# Patient Record
Sex: MALE | Race: BLACK OR AFRICAN AMERICAN | Employment: FULL TIME | URBAN - METROPOLITAN AREA
[De-identification: names, ages, dates, MRNs, and addresses within clinical notes are randomized per-mention and may not be internally consistent; named-entity substitution may affect disease eponyms.]

---

## 2019-09-29 ENCOUNTER — HOSPITAL ENCOUNTER (EMERGENCY)
Age: 34
Discharge: HOME | End: 2019-09-29
Attending: EMERGENCY MEDICINE
Payer: COMMERCIAL

## 2019-09-29 ENCOUNTER — APPOINTMENT (OUTPATIENT)
Dept: GENERAL RADIOLOGY | Age: 34
End: 2019-09-29
Attending: EMERGENCY MEDICINE
Payer: COMMERCIAL

## 2019-09-29 VITALS
HEIGHT: 72 IN | DIASTOLIC BLOOD PRESSURE: 117 MMHG | BODY MASS INDEX: 34.54 KG/M2 | SYSTOLIC BLOOD PRESSURE: 131 MMHG | OXYGEN SATURATION: 100 % | RESPIRATION RATE: 18 BRPM | TEMPERATURE: 97.8 F | HEART RATE: 105 BPM | WEIGHT: 255 LBS

## 2019-09-29 DIAGNOSIS — M76.60 ACHILLES TENDON PAIN: Primary | ICD-10-CM

## 2019-09-29 PROCEDURE — 75810000053 HC SPLINT APPLICATION

## 2019-09-29 PROCEDURE — 73610 X-RAY EXAM OF ANKLE: CPT

## 2019-09-29 PROCEDURE — 99282 EMERGENCY DEPT VISIT SF MDM: CPT

## 2019-09-29 RX ORDER — IBUPROFEN 600 MG/1
600 TABLET ORAL
Qty: 20 TAB | Refills: 0 | Status: SHIPPED | OUTPATIENT
Start: 2019-09-29

## 2019-09-29 NOTE — ED PROVIDER NOTES
EMERGENCY DEPARTMENT HISTORY AND PHYSICAL EXAM      Date: 9/29/2019  Patient Name: Claudia Ruiz    History of Presenting Illness     Chief Complaint   Patient presents with    Ankle Pain     reports pta while playing flag football, reports feeling a pop in his right ankle. took 2 aleve pta       History Provided By: Patient    HPI: Claudia Ruiz, 29 y.o. male presents to the ED with cc of right posterior ankle pain since prior to arrival.  Patient states he was playing flag football prior to arrival, his she was untied he stepped back and felt a pop in the back of his ankle. He notes he started with immediate ankle pain in the area. He attributes this to a possible Achilles tendon rupture. He notes someone on the field gave him 2 Aleve prior to arrival.  Pain is worse with movement, better with rest.  He denies any pain currently. Patient denies any knee pain, foot pain, tingling, numbness, chest pain, shortness of breath, and abdominal pain. Patient denies any history of injury or surgery to the right ankle. There are no other complaints, changes, or physical findings at this time. PCP: Lynne Costello MD    No current facility-administered medications on file prior to encounter. No current outpatient medications on file prior to encounter. Past History     Past Medical History:  No past medical history on file. Past Surgical History:  No past surgical history on file. Family History:  No family history on file. Social History:  Social History     Tobacco Use    Smoking status: Not on file   Substance Use Topics    Alcohol use: Not on file    Drug use: Not on file       Allergies:  No Known Allergies      Review of Systems   Review of Systems   Constitutional: Negative. Negative for chills and fever. HENT: Negative. Negative for rhinorrhea and sore throat. Eyes: Negative. Negative for visual disturbance. Respiratory: Negative.   Negative for cough, chest tightness, shortness of breath and wheezing. Cardiovascular: Negative. Negative for chest pain and palpitations. Gastrointestinal: Negative. Negative for abdominal pain, constipation, diarrhea, nausea and vomiting. Genitourinary: Negative. Negative for dysuria and hematuria. Musculoskeletal: Positive for arthralgias and joint swelling. Negative for myalgias. Skin: Negative. Negative for rash. Allergic/Immunologic: Negative. Negative for environmental allergies and food allergies. Neurological: Negative. Negative for headaches. Psychiatric/Behavioral: Negative. Negative for suicidal ideas. Physical Exam   Physical Exam   Constitutional: He is oriented to person, place, and time. He appears well-developed and well-nourished. No distress. Pt appears well, awake and alert in NAD. HENT:   Head: Normocephalic and atraumatic. Right Ear: External ear normal.   Left Ear: External ear normal.   Nose: Nose normal.   Eyes: Conjunctivae are normal. Right eye exhibits no discharge. Left eye exhibits no discharge. Cardiovascular: Normal rate, normal heart sounds and intact distal pulses. 2+ D.P. Pulses b/l. *   Pulmonary/Chest: Effort normal and breath sounds normal. No stridor. No respiratory distress. He has no wheezes. He has no rales. Abdominal: Soft. Bowel sounds are normal. There is no tenderness. There is no guarding. No CVA tenderness b/l. Musculoskeletal: He exhibits tenderness. He exhibits no edema. R ankle: No edema, erythema, or obvious bony deformity. No bony TTP. + TTP over achilles insertion site of posterior ankle. No achilles definition to posterior R ankle. Achilles definition to posterior L ankle. Difficult to assess Preston Post test due to patient size. Decreased flexion ROM secondary to discomfort. .   Neurological: He is alert and oriented to person, place, and time. Coordination normal.   No focal neuro deficits. Skin: Skin is warm and dry. No rash noted.  He is not diaphoretic. No erythema. No pallor. Psychiatric: He has a normal mood and affect. His behavior is normal.   Vitals reviewed. Diagnostic Study Results     Labs -   No results found for this or any previous visit (from the past 12 hour(s)). Radiologic Studies -   XR ANKLE RT MIN 3 V    (Results Pending)     CT Results  (Last 48 hours)    None        CXR Results  (Last 48 hours)    None          Medical Decision Making   I am the first provider for this patient. I reviewed the vital signs, available nursing notes, past medical history, past surgical history, family history and social history. Vital Signs-Reviewed the patient's vital signs. Patient Vitals for the past 12 hrs:   Temp Pulse Resp BP SpO2   09/29/19 1712 97.8 °F (36.6 °C) (!) 105 18 (!) 131/117 100 %       Provider Notes (Medical Decision Making):   Achilles tendon rupture, sprain, strain    ED Course:   Initial assessment performed. The patients presenting problems have been discussed, and they are in agreement with the care plan formulated and outlined with them. I have encouraged them to ask questions as they arise throughout their visit. Procedure Note - Splint Placement:  6:25 PM  Performed by: MORIAH Martin  Neurovascularly intact prior to tx. An Orthoglass posterior splint was placed on pt's right ankle. Joint was placed in plantar flexion. Neurovascularly intact after tx. The procedure took 1-15 minutes, and pt tolerated well. Written by MORIAH Martin. Disposition:  6:33PM    PLAN:  1. Discharge Medication List as of 9/29/2019  6:08 PM      START taking these medications    Details   ibuprofen (MOTRIN) 600 mg tablet Take 1 Tab by mouth every six (6) hours as needed for Pain., Print, Disp-20 Tab, R-0           2.    Follow-up Information     Follow up With Specialties Details Why Contact Info    Kan Delong MD Orthopedic Surgery Schedule an appointment as soon as possible for a visit in 2 days [facility] EMERGENCY DEPT Emergency Medicine  As needed or, If symptoms worsen [address] [place] [address]  [phone]        3. RICE, crutches for ambulation  Return to ED if worse     Diagnosis     Clinical Impression:   1. Achilles tendon pain        Attestations:    MORIAH Garcia    Please note that this dictation was completed with [employer], the computer voice recognition software. Quite often unanticipated grammatical, syntax, homophones, and other interpretive errors are inadvertently transcribed by the computer software. Please disregard these errors. Please excuse any errors that have escaped final proofreading. Thank you. This note will not be viewable in [address]

## 2019-09-29 NOTE — DISCHARGE INSTRUCTIONS
Patient Education   1. Start ibuprofen  2. Rest, ice  3. Follow up with ortho     Achilles Tendon Tear: Care Instructions  Your Care Instructions    You have ruptured or torn your Achilles tendon. The Achilles tendon (also called the heel cord) connects the calf muscles on the back of the lower leg to the bone at the base of the heel. Treatment for an Achilles tendon injury depends on whether the tendon has been partially torn or completely ruptured. A cast or splint can often treat a partial tear. If your tendon has ruptured, you may need surgery. You and your orthopedic doctor will choose a treatment plan, so it is important to go to any follow-up appointments. Follow-up care is a key part of your treatment and safety. Be sure to make and go to all appointments, and call your doctor if you are having problems. It's also a good idea to know your test results and keep a list of the medicines you take. How can you care for yourself at home? · Prop up the sore foot on a pillow anytime you sit or lie down during the next 3 days. Try to keep it above the level of your heart. This will help reduce swelling. · Take pain medicines exactly as directed. ? If the doctor gave you a prescription medicine for pain, take it as prescribed. ? If you are not taking a prescription pain medicine, ask your doctor if you can take an over-the-counter medicine. · Do not put weight on the affected foot until your doctor says you can. Use crutches or a walker. · Wear the splint or cast as directed until your doctor says you can remove it. When should you call for help? Call 911 anytime you think you may need emergency care.  For example, call if:    · You have chest pain, are short of breath, or you cough up blood.    Call your doctor now or seek immediate medical care if:    · You have new or worse pain.     · Your foot is cool or pale or changes color.     · You have tingling, weakness, or numbness in your toes.     · Your cast or splint feels too tight.     · You have signs of a blood clot in your leg (called a deep vein thrombosis), such as:  ? Pain in your calf, back of the knee, thigh, or groin. ? Redness or swelling in your leg.    Watch closely for changes in your health, and be sure to contact your doctor if:    · You have a problem with your splint or cast.     · You do not get better as expected. Where can you learn more? Go to http://segundo-martha.info/. Enter F495 in the search box to learn more about \"Achilles Tendon Tear: Care Instructions. \"  Current as of: June 26, 2019  Content Version: 12.2  © 2161-1611 [employer]. Care instructions adapted under license by [employer] (which disclaims liability or warranty for this information). If you have questions about a medical condition or this instruction, always ask your healthcare professional. [address] any warranty or liability for your use of this information.

## 2019-09-29 NOTE — ED NOTES
Splint applied by PA. Discharge instructions provided to patient by PA/MD. VSS. Patient discharged by wheelchair.

## 2019-09-29 NOTE — LETTER
[address] 
[facility] EMERGENCY DEPT 
[address] [place] [id number] [phone] Work/School Note Date: 9/29/2019 To Whom It May concern: 
 
Philippe Teran was seen and treated today in the emergency room by the following provider(s): 
Attending Provider: Laci Sosa MD 
Physician Assistant: Aakash Cobb may return to work in 1-2 days or once cleared by a licensed healthcare physician. Sincerely, Richard Garcia

## 2019-09-29 NOTE — ED NOTES
Assumed care of patient. Patient is alert and oriented, does not appear to be in distress. Patient ambulatory to ED with c/o right ankle pain secondary to flag football injury. Ice applied by on scene medical providers. Patient positioned for comfort with call bell within reach. Side rails up for safety. Provider to evaluate patient.